# Patient Record
Sex: FEMALE | Race: WHITE | NOT HISPANIC OR LATINO | Employment: FULL TIME | URBAN - NONMETROPOLITAN AREA
[De-identification: names, ages, dates, MRNs, and addresses within clinical notes are randomized per-mention and may not be internally consistent; named-entity substitution may affect disease eponyms.]

---

## 2019-01-22 ENCOUNTER — APPOINTMENT (OUTPATIENT)
Dept: GENERAL RADIOLOGY | Facility: HOSPITAL | Age: 24
End: 2019-01-22

## 2019-01-22 ENCOUNTER — HOSPITAL ENCOUNTER (EMERGENCY)
Facility: HOSPITAL | Age: 24
Discharge: HOME | End: 2019-01-22
Attending: FAMILY MEDICINE | Admitting: FAMILY MEDICINE

## 2019-01-22 VITALS
TEMPERATURE: 97.6 F | WEIGHT: 244 LBS | RESPIRATION RATE: 20 BRPM | OXYGEN SATURATION: 96 % | BODY MASS INDEX: 41.66 KG/M2 | HEIGHT: 64 IN | SYSTOLIC BLOOD PRESSURE: 130 MMHG | DIASTOLIC BLOOD PRESSURE: 64 MMHG | HEART RATE: 109 BPM

## 2019-01-22 DIAGNOSIS — R06.00 DYSPNEA, UNSPECIFIED TYPE: Primary | ICD-10-CM

## 2019-01-22 LAB
ALBUMIN SERPL-MCNC: 4.7 G/DL (ref 3.4–4.8)
ALBUMIN/GLOB SERPL: 1.6 G/DL (ref 1.1–1.8)
ALP SERPL-CCNC: 107 U/L (ref 38–126)
ALT SERPL W P-5'-P-CCNC: 23 U/L (ref 9–52)
ANION GAP SERPL CALCULATED.3IONS-SCNC: 9 MMOL/L (ref 5–15)
AST SERPL-CCNC: 24 U/L (ref 14–36)
BASOPHILS # BLD AUTO: 0.01 10*3/MM3 (ref 0–0.2)
BASOPHILS NFR BLD AUTO: 0.1 % (ref 0–2)
BILIRUB SERPL-MCNC: 0.2 MG/DL (ref 0.2–1.3)
BUN BLD-MCNC: 11 MG/DL (ref 7–21)
BUN/CREAT SERPL: 11.7 (ref 7–25)
CALCIUM SPEC-SCNC: 10.6 MG/DL (ref 8.4–10.2)
CHLORIDE SERPL-SCNC: 110 MMOL/L (ref 95–110)
CO2 SERPL-SCNC: 20 MMOL/L (ref 22–31)
CREAT BLD-MCNC: 0.94 MG/DL (ref 0.5–1)
D-DIMER, QUANTITATIVE (MAD,POW, STR): 390 NG/ML (FEU) (ref 0–470)
DEPRECATED RDW RBC AUTO: 45.1 FL (ref 36.4–46.3)
EOSINOPHIL # BLD AUTO: 0.01 10*3/MM3 (ref 0–0.7)
EOSINOPHIL NFR BLD AUTO: 0.1 % (ref 0–7)
ERYTHROCYTE [DISTWIDTH] IN BLOOD BY AUTOMATED COUNT: 15.8 % (ref 11.5–14.5)
GFR SERPL CREATININE-BSD FRML MDRD: 75 ML/MIN/1.73 (ref 71–165)
GLOBULIN UR ELPH-MCNC: 2.9 GM/DL (ref 2.3–3.5)
GLUCOSE BLD-MCNC: 119 MG/DL (ref 60–100)
HCG SERPL QL: NEGATIVE
HCT VFR BLD AUTO: 40.6 % (ref 35–45)
HGB BLD-MCNC: 13.6 G/DL (ref 12–15.5)
IMM GRANULOCYTES # BLD AUTO: 0.02 10*3/MM3 (ref 0–0.02)
IMM GRANULOCYTES NFR BLD AUTO: 0.2 % (ref 0–0.5)
LYMPHOCYTES # BLD AUTO: 0.83 10*3/MM3 (ref 0.6–4.2)
LYMPHOCYTES NFR BLD AUTO: 8.3 % (ref 10–50)
MCH RBC QN AUTO: 26.9 PG (ref 26.5–34)
MCHC RBC AUTO-ENTMCNC: 33.5 G/DL (ref 31.4–36)
MCV RBC AUTO: 80.2 FL (ref 80–98)
MONOCYTES # BLD AUTO: 0.1 10*3/MM3 (ref 0–0.9)
MONOCYTES NFR BLD AUTO: 1 % (ref 0–12)
NEUTROPHILS # BLD AUTO: 9 10*3/MM3 (ref 2–8.6)
NEUTROPHILS NFR BLD AUTO: 90.3 % (ref 37–80)
PLATELET # BLD AUTO: 324 10*3/MM3 (ref 150–450)
PMV BLD AUTO: 8.8 FL (ref 8–12)
POTASSIUM BLD-SCNC: 4.2 MMOL/L (ref 3.5–5.1)
PROT SERPL-MCNC: 7.6 G/DL (ref 6.3–8.6)
RBC # BLD AUTO: 5.06 10*6/MM3 (ref 3.77–5.16)
SODIUM BLD-SCNC: 139 MMOL/L (ref 137–145)
WBC NRBC COR # BLD: 9.97 10*3/MM3 (ref 3.2–9.8)

## 2019-01-22 PROCEDURE — 80053 COMPREHEN METABOLIC PANEL: CPT | Performed by: PHYSICIAN ASSISTANT

## 2019-01-22 PROCEDURE — 85025 COMPLETE CBC W/AUTO DIFF WBC: CPT | Performed by: PHYSICIAN ASSISTANT

## 2019-01-22 PROCEDURE — 71046 X-RAY EXAM CHEST 2 VIEWS: CPT

## 2019-01-22 PROCEDURE — 93010 ELECTROCARDIOGRAM REPORT: CPT | Performed by: INTERNAL MEDICINE

## 2019-01-22 PROCEDURE — 84703 CHORIONIC GONADOTROPIN ASSAY: CPT | Performed by: FAMILY MEDICINE

## 2019-01-22 PROCEDURE — 99283 EMERGENCY DEPT VISIT LOW MDM: CPT

## 2019-01-22 PROCEDURE — 93005 ELECTROCARDIOGRAM TRACING: CPT | Performed by: FAMILY MEDICINE

## 2019-01-22 PROCEDURE — 85379 FIBRIN DEGRADATION QUANT: CPT | Performed by: PHYSICIAN ASSISTANT

## 2019-01-22 RX ORDER — MONTELUKAST SODIUM 10 MG/1
10 TABLET ORAL NIGHTLY
COMMUNITY

## 2019-01-22 RX ORDER — LEVALBUTEROL INHALATION SOLUTION 1.25 MG/3ML
1 SOLUTION RESPIRATORY (INHALATION) EVERY 8 HOURS PRN
COMMUNITY

## 2019-01-22 RX ORDER — LEVALBUTEROL TARTRATE 45 UG/1
2 AEROSOL, METERED ORAL EVERY 4 HOURS PRN
COMMUNITY

## 2019-01-22 RX ORDER — TOPIRAMATE 50 MG/1
50 TABLET, FILM COATED ORAL DAILY
COMMUNITY

## 2019-01-22 RX ORDER — PREDNISONE 20 MG/1
20 TABLET ORAL 2 TIMES DAILY
COMMUNITY
Start: 2019-01-21 | End: 2019-01-30

## 2019-01-22 RX ORDER — IPRATROPIUM BROMIDE AND ALBUTEROL SULFATE 2.5; .5 MG/3ML; MG/3ML
3 SOLUTION RESPIRATORY (INHALATION) ONCE
Status: DISCONTINUED | OUTPATIENT
Start: 2019-01-22 | End: 2019-01-22

## 2019-01-22 RX ORDER — SPIRONOLACTONE 50 MG/1
50 TABLET, FILM COATED ORAL DAILY
COMMUNITY

## 2019-01-22 RX ORDER — OMEPRAZOLE 40 MG/1
40 CAPSULE, DELAYED RELEASE ORAL 2 TIMES DAILY
COMMUNITY

## 2019-01-22 RX ORDER — SODIUM CHLORIDE 0.9 % (FLUSH) 0.9 %
10 SYRINGE (ML) INJECTION AS NEEDED
Status: DISCONTINUED | OUTPATIENT
Start: 2019-01-22 | End: 2019-01-22 | Stop reason: HOSPADM

## 2019-01-22 RX ORDER — LEVONORGESTREL 1.5 MG/1
1.5 TABLET ORAL ONCE
COMMUNITY

## 2019-01-22 RX ORDER — BUDESONIDE 0.5 MG/2ML
0.5 INHALANT ORAL
COMMUNITY

## 2019-01-22 RX ADMIN — Medication 10 ML: at 13:15

## 2019-01-22 NOTE — ED PROVIDER NOTES
Subjective   Patient presents to emergency department for dyspnea/chest pain x 2 days.  Hx of asthma worsening over the past month.  Using nebulizer and inhaler.  Several courses of oral steroids over the past month with some improvement.  Had negative work up over the past 2 days at urgent care to include cardiac markers, d-dimer, vq perfusion scan.  Has follow up with pulmonologist on 2/1/2019.        History provided by:  Patient   used: No    Chest Pain   Pain location:  Substernal area  Pain quality: sharp    Pain radiates to:  Does not radiate  Pain severity:  Moderate  Onset quality:  Sudden  Duration:  2 days  Timing:  Constant  Progression:  Unchanged  Context: breathing    Associated symptoms: cough, orthopnea and shortness of breath    Associated symptoms: no abdominal pain, no altered mental status, no anxiety, no back pain, no diaphoresis, no dizziness, no dysphagia, no fever, no lower extremity edema, no nausea, no near-syncope, no palpitations, no syncope, no vomiting and no weakness    Risk factors: obesity    Risk factors: no coronary artery disease, no diabetes mellitus, not pregnant, no prior DVT/PE and no smoking    Shortness of Breath   Associated symptoms: chest pain and cough    Associated symptoms: no abdominal pain, no diaphoresis, no fever, no neck pain, no sore throat, no syncope and no vomiting        Review of Systems   Constitutional: Negative for diaphoresis and fever.   HENT: Negative for sore throat and trouble swallowing.    Eyes: Negative for visual disturbance.   Respiratory: Positive for cough, chest tightness and shortness of breath.    Cardiovascular: Positive for chest pain and orthopnea. Negative for palpitations, syncope and near-syncope.   Gastrointestinal: Negative for abdominal pain, diarrhea, nausea and vomiting.   Genitourinary: Negative for dysuria and flank pain.   Musculoskeletal: Negative for back pain and neck pain.   Skin: Negative for color  "change.   Allergic/Immunologic: Negative for immunocompromised state.   Neurological: Negative for dizziness and weakness.   Hematological: Does not bruise/bleed easily.   Psychiatric/Behavioral: Negative for confusion.       Past Medical History:   Diagnosis Date   • Asthma    • GERD (gastroesophageal reflux disease)    • IBS (irritable bowel syndrome)    • Migraine    • Neurocardiogenic syncope    • PCOS (polycystic ovarian syndrome)        Allergies   Allergen Reactions   • Phenergan [Promethazine Hcl] GI Intolerance       Past Surgical History:   Procedure Laterality Date   • ADENOIDECTOMY     • CHOLECYSTECTOMY     • COLONOSCOPY     • ENDOSCOPY     • SINUS SURGERY     • TONSILLECTOMY         History reviewed. No pertinent family history.    Social History     Socioeconomic History   • Marital status: Single     Spouse name: Not on file   • Number of children: Not on file   • Years of education: Not on file   • Highest education level: Not on file   Tobacco Use   • Smoking status: Former Smoker     Years: 2.00     Types: Cigarettes     Last attempt to quit: 2016     Years since quitting: 3.0   • Smokeless tobacco: Never Used   Substance and Sexual Activity   • Alcohol use: Yes     Comment: occasionally   • Drug use: No   • Sexual activity: Defer           Objective      /64 (BP Location: Left arm, Patient Position: Sitting)   Pulse 109   Temp 97.6 °F (36.4 °C) (Temporal)   Resp 20   Ht 162.6 cm (64\")   Wt 111 kg (244 lb)   LMP 01/08/2019 (Exact Date)   SpO2 96%   BMI 41.88 kg/m²     Physical Exam   Constitutional: She is oriented to person, place, and time. She appears well-developed and well-nourished.   HENT:   Head: Normocephalic and atraumatic.   Eyes: Conjunctivae are normal.   Cardiovascular: Normal rate, regular rhythm, normal heart sounds and intact distal pulses.   Pulmonary/Chest: Effort normal and breath sounds normal. No respiratory distress. She has no wheezes.   Musculoskeletal: She " exhibits no edema.   Neurological: She is alert and oriented to person, place, and time.   Skin: Skin is warm. Capillary refill takes less than 2 seconds.   Psychiatric: She has a normal mood and affect. Her behavior is normal. Thought content normal.   Nursing note and vitals reviewed.      ECG 12 Lead    Date/Time: 1/22/2019 2:01 PM  Performed by: Damien Montes PA-C  Authorized by: Melvin Moss MD   Interpreted by physician  Comparison: not compared with previous ECG   Rhythm: sinus tachycardia  Rate: tachycardic  BPM: 110  ST Segments: ST segments normal  Clinical impression: non-specific ECG                 ED Course  ED Course as of Jan 22 1544   Tue Jan 22, 2019   1255 Patient refuses duo-neb  [NAOMY]   1350 Patient had a full work up over the past 2 days to include cardiac markers, d dimer, repeat ekg, chest xray, vq perfusion scan,  all negative.    [NAOMY]      ED Course User Index  [NAOMY] Damien Montes PA-C      Results for orders placed or performed during the hospital encounter of 01/22/19   Comprehensive Metabolic Panel   Result Value Ref Range    Glucose 119 (H) 60 - 100 mg/dL    BUN 11 7 - 21 mg/dL    Creatinine 0.94 0.50 - 1.00 mg/dL    Sodium 139 137 - 145 mmol/L    Potassium 4.2 3.5 - 5.1 mmol/L    Chloride 110 95 - 110 mmol/L    CO2 20.0 (L) 22.0 - 31.0 mmol/L    Calcium 10.6 (H) 8.4 - 10.2 mg/dL    Total Protein 7.6 6.3 - 8.6 g/dL    Albumin 4.70 3.40 - 4.80 g/dL    ALT (SGPT) 23 9 - 52 U/L    AST (SGOT) 24 14 - 36 U/L    Alkaline Phosphatase 107 38 - 126 U/L    Total Bilirubin 0.2 0.2 - 1.3 mg/dL    eGFR Non  Amer 75 71 - 165 mL/min/1.73    Globulin 2.9 2.3 - 3.5 gm/dL    A/G Ratio 1.6 1.1 - 1.8 g/dL    BUN/Creatinine Ratio 11.7 7.0 - 25.0    Anion Gap 9.0 5.0 - 15.0 mmol/L   D-dimer, Quantitative   Result Value Ref Range    D-Dimer, Quantitative 390 0 - 470 ng/mL (FEU)   CBC Auto Differential   Result Value Ref Range    WBC 9.97 (H) 3.20 - 9.80 10*3/mm3    RBC 5.06 3.77  - 5.16 10*6/mm3    Hemoglobin 13.6 12.0 - 15.5 g/dL    Hematocrit 40.6 35.0 - 45.0 %    MCV 80.2 80.0 - 98.0 fL    MCH 26.9 26.5 - 34.0 pg    MCHC 33.5 31.4 - 36.0 g/dL    RDW 15.8 (H) 11.5 - 14.5 %    RDW-SD 45.1 36.4 - 46.3 fl    MPV 8.8 8.0 - 12.0 fL    Platelets 324 150 - 450 10*3/mm3    Neutrophil % 90.3 (H) 37.0 - 80.0 %    Lymphocyte % 8.3 (L) 10.0 - 50.0 %    Monocyte % 1.0 0.0 - 12.0 %    Eosinophil % 0.1 0.0 - 7.0 %    Basophil % 0.1 0.0 - 2.0 %    Immature Grans % 0.2 0.0 - 0.5 %    Neutrophils, Absolute 9.00 (H) 2.00 - 8.60 10*3/mm3    Lymphocytes, Absolute 0.83 0.60 - 4.20 10*3/mm3    Monocytes, Absolute 0.10 0.00 - 0.90 10*3/mm3    Eosinophils, Absolute 0.01 0.00 - 0.70 10*3/mm3    Basophils, Absolute 0.01 0.00 - 0.20 10*3/mm3    Immature Grans, Absolute 0.02 0.00 - 0.02 10*3/mm3   hCG, Serum, Qualitative   Result Value Ref Range    HCG Qualitative Negative Negative     Xr Chest Pa & Lateral    Result Date: 1/22/2019  Narrative: PROCEDURE: XR CHEST PA AND LATERAL CLINICAL HISTORY: dyspnea INDICATION: Same as above COMPARISON: 1/21/2019 TECHNIQUE: PA and and lateral chest radiographs were obtained. FINDINGS: The lung fields are well inflated.   There are no discrete airspace infiltrates, pneumothoraces or pleural effusions. The pulmonary vascularity is normal The cardiomediastinal silhouette is stable.     Impression: There is no acute pleural-parenchymal process seen in the imaged lung fields. Place of interpretation: Teleradiology. Electronically signed by:  Huseyin Sun MD  1/22/2019 1:40 PM [facility]station[facility]      Final diagnoses:   Dyspnea, unspecified type            Damien Montes PA-C  01/22/19 1544